# Patient Record
Sex: FEMALE | Race: WHITE | NOT HISPANIC OR LATINO | Employment: FULL TIME | URBAN - METROPOLITAN AREA
[De-identification: names, ages, dates, MRNs, and addresses within clinical notes are randomized per-mention and may not be internally consistent; named-entity substitution may affect disease eponyms.]

---

## 2023-09-28 ENCOUNTER — TRANSCRIBE ORDERS (OUTPATIENT)
Dept: RADIOLOGY | Facility: CLINIC | Age: 53
End: 2023-09-28
Payer: COMMERCIAL

## 2023-09-28 DIAGNOSIS — N93.8 DUB (DYSFUNCTIONAL UTERINE BLEEDING): ICD-10-CM

## 2023-10-20 PROBLEM — L30.9 DERMATITIS, UNSPECIFIED: Status: ACTIVE | Noted: 2018-05-10

## 2023-10-20 PROBLEM — L21.8 OTHER SEBORRHEIC DERMATITIS: Status: ACTIVE | Noted: 2018-05-10

## 2023-10-20 PROBLEM — N93.8 DUB (DYSFUNCTIONAL UTERINE BLEEDING): Status: ACTIVE | Noted: 2023-10-20

## 2023-10-20 PROBLEM — H60.8X3 CHRONIC ECZEMATOUS OTITIS EXTERNA OF BOTH EARS: Status: ACTIVE | Noted: 2023-10-20

## 2023-10-20 PROBLEM — G44.209 TENSION HEADACHE: Status: ACTIVE | Noted: 2023-10-20

## 2023-10-20 PROBLEM — H60.12 CELLULITIS OF LEFT EAR: Status: ACTIVE | Noted: 2023-10-20

## 2023-10-20 PROBLEM — L81.4 OTHER MELANIN HYPERPIGMENTATION: Status: ACTIVE | Noted: 2018-05-10

## 2023-10-20 PROBLEM — R10.2 PELVIC PAIN IN FEMALE: Status: ACTIVE | Noted: 2023-10-20

## 2023-10-20 PROBLEM — B35.1 TINEA UNGUIUM: Status: ACTIVE | Noted: 2018-05-10

## 2023-10-20 RX ORDER — ITRACONAZOLE 100 MG/1
2 CAPSULE ORAL
COMMUNITY
Start: 2017-05-22 | End: 2023-11-13 | Stop reason: ALTCHOICE

## 2023-10-20 RX ORDER — EFINACONAZOLE 100 MG/ML
1 SOLUTION TOPICAL
COMMUNITY
Start: 2018-02-05 | End: 2023-11-13 | Stop reason: ALTCHOICE

## 2023-10-20 RX ORDER — FLUOCINONIDE 1 MG/G
1 CREAM TOPICAL
COMMUNITY
Start: 2018-05-10 | End: 2023-11-13 | Stop reason: ALTCHOICE

## 2023-10-20 RX ORDER — IODOQUINOL 1% - HYDROCORTISONE ACETATE 2% - ALOE POLYSACCHARIDES 1% 10; 20; 10 MG/G; MG/G; MG/G
1 GEL TOPICAL
COMMUNITY
Start: 2018-05-10 | End: 2023-12-04 | Stop reason: ALTCHOICE

## 2023-10-23 ENCOUNTER — OFFICE VISIT (OUTPATIENT)
Dept: OBSTETRICS AND GYNECOLOGY | Facility: CLINIC | Age: 53
End: 2023-10-23
Payer: COMMERCIAL

## 2023-10-23 ENCOUNTER — ANCILLARY PROCEDURE (OUTPATIENT)
Dept: RADIOLOGY | Facility: CLINIC | Age: 53
End: 2023-10-23
Payer: COMMERCIAL

## 2023-10-23 DIAGNOSIS — N92.1 MENORRHAGIA WITH IRREGULAR CYCLE: Primary | ICD-10-CM

## 2023-10-23 DIAGNOSIS — N93.8 DUB (DYSFUNCTIONAL UTERINE BLEEDING): ICD-10-CM

## 2023-10-23 PROCEDURE — 99213 OFFICE O/P EST LOW 20 MIN: CPT | Performed by: OBSTETRICS & GYNECOLOGY

## 2023-10-23 PROCEDURE — 58340 CATHETER FOR HYSTEROGRAPHY: CPT | Performed by: OBSTETRICS & GYNECOLOGY

## 2023-10-23 PROCEDURE — 76830 TRANSVAGINAL US NON-OB: CPT | Performed by: OBSTETRICS & GYNECOLOGY

## 2023-10-23 NOTE — PROGRESS NOTES
Pt here for SIS for menorrhagia, EMB showed possible infarcted polyp  R/b/a reviewed  Verbal consent    Sis shows normal endometrial cavity, 2cm fundal fibroid, normal ovaries    D/w pt normal findings and options for menorrhagia  Pt opts for endometrial ablation  Procedure and risks reviewed  Will verify benefits and schedule

## 2023-10-24 ENCOUNTER — TELEPHONE (OUTPATIENT)
Dept: OBSTETRICS AND GYNECOLOGY | Facility: HOSPITAL | Age: 53
End: 2023-10-24
Payer: COMMERCIAL

## 2023-11-02 ENCOUNTER — PREP FOR PROCEDURE (OUTPATIENT)
Dept: OBSTETRICS AND GYNECOLOGY | Facility: HOSPITAL | Age: 53
End: 2023-11-02
Payer: COMMERCIAL

## 2023-11-02 DIAGNOSIS — N92.1 MENORRHAGIA WITH IRREGULAR CYCLE: Primary | ICD-10-CM

## 2023-11-02 RX ORDER — SODIUM CHLORIDE, SODIUM LACTATE, POTASSIUM CHLORIDE, CALCIUM CHLORIDE 600; 310; 30; 20 MG/100ML; MG/100ML; MG/100ML; MG/100ML
100 INJECTION, SOLUTION INTRAVENOUS CONTINUOUS
Status: CANCELLED | OUTPATIENT
Start: 2023-11-02

## 2023-11-09 ENCOUNTER — HOSPITAL ENCOUNTER (OUTPATIENT)
Dept: RADIOLOGY | Facility: HOSPITAL | Age: 53
Discharge: HOME | End: 2023-11-09
Payer: COMMERCIAL

## 2023-11-09 DIAGNOSIS — Z12.31 ENCOUNTER FOR SCREENING MAMMOGRAM FOR MALIGNANT NEOPLASM OF BREAST: ICD-10-CM

## 2023-11-09 PROCEDURE — 77063 BREAST TOMOSYNTHESIS BI: CPT | Mod: BILATERAL PROCEDURE | Performed by: STUDENT IN AN ORGANIZED HEALTH CARE EDUCATION/TRAINING PROGRAM

## 2023-11-09 PROCEDURE — 77063 BREAST TOMOSYNTHESIS BI: CPT

## 2023-11-09 PROCEDURE — 77067 SCR MAMMO BI INCL CAD: CPT | Mod: BILATERAL PROCEDURE | Performed by: STUDENT IN AN ORGANIZED HEALTH CARE EDUCATION/TRAINING PROGRAM

## 2023-11-10 DIAGNOSIS — Z01.818 PREOP TESTING: Primary | ICD-10-CM

## 2023-11-13 ENCOUNTER — PRE-ADMISSION TESTING (OUTPATIENT)
Dept: PREADMISSION TESTING | Facility: HOSPITAL | Age: 53
End: 2023-11-13
Payer: COMMERCIAL

## 2023-11-13 ENCOUNTER — HOSPITAL ENCOUNTER (OUTPATIENT)
Dept: CARDIOLOGY | Facility: HOSPITAL | Age: 53
Discharge: HOME | End: 2023-11-13
Payer: COMMERCIAL

## 2023-11-13 VITALS
RESPIRATION RATE: 18 BRPM | WEIGHT: 171.08 LBS | HEART RATE: 92 BPM | DIASTOLIC BLOOD PRESSURE: 81 MMHG | BODY MASS INDEX: 29.21 KG/M2 | TEMPERATURE: 97.3 F | HEIGHT: 64 IN | OXYGEN SATURATION: 100 % | SYSTOLIC BLOOD PRESSURE: 147 MMHG

## 2023-11-13 DIAGNOSIS — Z01.818 PRE-OP TESTING: ICD-10-CM

## 2023-11-13 LAB
B-HCG SERPL-ACNC: <2 MIU/ML
ERYTHROCYTE [DISTWIDTH] IN BLOOD BY AUTOMATED COUNT: 12.4 % (ref 11.5–14.5)
HCT VFR BLD AUTO: 41.8 % (ref 36–46)
HGB BLD-MCNC: 13.5 G/DL (ref 12–16)
MCH RBC QN AUTO: 27.5 PG (ref 26–34)
MCHC RBC AUTO-ENTMCNC: 32.3 G/DL (ref 32–36)
MCV RBC AUTO: 85 FL (ref 80–100)
NRBC BLD-RTO: 0 /100 WBCS (ref 0–0)
PLATELET # BLD AUTO: 303 X10*3/UL (ref 150–450)
RBC # BLD AUTO: 4.91 X10*6/UL (ref 4–5.2)
WBC # BLD AUTO: 6.3 X10*3/UL (ref 4.4–11.3)

## 2023-11-13 PROCEDURE — 93010 ELECTROCARDIOGRAM REPORT: CPT | Performed by: INTERNAL MEDICINE

## 2023-11-13 PROCEDURE — 94760 N-INVAS EAR/PLS OXIMETRY 1: CPT

## 2023-11-13 PROCEDURE — 84702 CHORIONIC GONADOTROPIN TEST: CPT | Performed by: NURSE PRACTITIONER

## 2023-11-13 PROCEDURE — 99203 OFFICE O/P NEW LOW 30 MIN: CPT | Performed by: NURSE PRACTITIONER

## 2023-11-13 PROCEDURE — 36415 COLL VENOUS BLD VENIPUNCTURE: CPT | Performed by: NURSE PRACTITIONER

## 2023-11-13 PROCEDURE — 36415 COLL VENOUS BLD VENIPUNCTURE: CPT | Performed by: OBSTETRICS & GYNECOLOGY

## 2023-11-13 PROCEDURE — 93005 ELECTROCARDIOGRAM TRACING: CPT

## 2023-11-13 PROCEDURE — 85027 COMPLETE CBC AUTOMATED: CPT | Performed by: OBSTETRICS & GYNECOLOGY

## 2023-11-13 ASSESSMENT — DUKE ACTIVITY SCORE INDEX (DASI)
CAN YOU DO HEAVY WORK AROUND THE HOUSE LIKE SCRUBBING FLOORS OR LIFTING AND MOVING HEAVY FURNITURE: YES
CAN YOU WALK INDOORS, SUCH AS AROUND YOUR HOUSE: YES
CAN YOU DO YARD WORK LIKE RAKING LEAVES, WEEDING OR PUSHING A MOWER: YES
CAN YOU TAKE CARE OF YOURSELF (EAT, DRESS, BATHE, OR USE TOILET): YES
CAN YOU PARTICIPATE IN MODERATE RECREATIONAL ACTIVITIES LIKE GOLF, BOWLING, DANCING, DOUBLES TENNIS OR THROWING A BASEBALL OR FOOTBALL: NO
CAN YOU RUN A SHORT DISTANCE: YES
CAN YOU DO LIGHT WORK AROUND THE HOUSE LIKE DUSTING OR WASHING DISHES: YES
CAN YOU PARTICIPATE IN STRENOUS SPORTS LIKE SWIMMING, SINGLES TENNIS, FOOTBALL, BASKETBALL, OR SKIING: NO
CAN YOU CLIMB A FLIGHT OF STAIRS OR WALK UP A HILL: YES
CAN YOU WALK A BLOCK OR TWO ON LEVEL GROUND: YES
CAN YOU HAVE SEXUAL RELATIONS: YES
DASI METS SCORE: 8.2
CAN YOU DO MODERATE WORK AROUND THE HOUSE LIKE VACUUMING, SWEEPING FLOORS OR CARRYING GROCERIES: YES
TOTAL_SCORE: 44.7

## 2023-11-13 ASSESSMENT — PAIN SCALES - GENERAL: PAINLEVEL_OUTOF10: 0 - NO PAIN

## 2023-11-13 ASSESSMENT — PAIN - FUNCTIONAL ASSESSMENT: PAIN_FUNCTIONAL_ASSESSMENT: 0-10

## 2023-11-13 NOTE — H&P (VIEW-ONLY)
CPM/PAT Evaluation       Name: Katherine Siegel (Katherine Siegel)  /Age: 1970/52 y.o.     In-Person       Chief Complaint: menorrhagia    52 yr old female with c/o heavy menses.  Reports ongoing bothersome menses symptoms including heavy bleeding and increased menses lasting up to 2-3 weeks long: denies any increased cramping pain or changes in cycle.  Reports remaining otherwise physically active: denies cardiac or respiratory symptoms.  Denies past issues with anesthesia.         Past Medical History:   Diagnosis Date    Mild intermittent asthma, uncomplicated     Intermittent asthma    Skin disorder     Vision loss        Past Surgical History:   Procedure Laterality Date    DILATION AND CURETTAGE OF UTERUS  2018    Dilation And Curettage    DILATION AND CURETTAGE OF UTERUS      OTHER SURGICAL HISTORY  2018    Oral Surgery Tooth Extraction [place] [facility]    WISDOM TOOTH EXTRACTION         Patient Sexual activity questions deferred to the physician.    Family History   Problem Relation Name Age of Onset    Cancer Mother      Prostate cancer Father      Other (dm2 without complication umnspec long term insulin use status) Father      Thyroid disease Father         Allergies   Allergen Reactions    Cephalosporins Unknown    Vantin [Cefpodoxime] Unknown       Prior to Admission medications    Medication Sig Start Date End Date Taking? Authorizing Provider   efinaconazole (Jublia) 10 % solution with applicator 1 Application. 18   Historical Provider, MD   fluocinonide (LIDEX) 0.1 % cream 1 Application. 5/10/18   Historical Provider, MD   hydrocortisone-iodoquinL-aloe2 (Alcortin A) 2-1-1 % gel 1 Application. 5/10/18   Historical Provider, MD   HYDROCORTISONE-IODOQUINOL TOP 1 Application. 17   Historical Provider, MD   itraconazole (Sporanox) 100 mg capsule 2 capsules (200 mg). 17   Historical Provider, MD        Review of Systems  Constitutional: no fever, no chills and not feeling poorly.    Eyes: no eyesight problems.   ENT: no hearing loss, no nosebleeds and no sore throat.   Cardiovascular: no chest pain, no palpitations and no extremity edema.   Respiratory: no shortness of breath, no wheezing, no cough and no shortness of breath during exertion.   Gastrointestinal: no abdominal pain, no constipation, no heartburn, no diarrhea, no vomiting and no blood in stools.   Genitourinary: no dysuria, no incontinence, normal micturition and no testicular pain.   Musculoskeletal: no arthralgias and no gait abnormality.   Integumentary: no skin lesions, no skin wound and no change in a mole.   Neurological: no confusion, no dizziness, no fainting and no difficulty walking.   Psychiatric: not suicidal, no anxiety and no depression.   All other systems have been reviewed and are negative for complaint.     Physical Exam  Constitutional:       General: No acute distress.     Aox3, pleasant and cooperative, appropriate mood and eye contact   HENT:      Head: Normocephalic.      Mouth/Throat: Mucous membranes moist & pink  Eyes:      Vision grossly intact, PERRLA, corrective lenses in use   Neck:      No carotid bruit, no JVD  Cardiovascular:      RRR, S1S2, no murmurs, rubs or gallops  Pulmonary:      Symmetric chest expansion, CTA, Room Air  Abdominal:      Soft non-tender, BSx4   Skin:     Warm, dry & intact   Extremities:      No gross deformities; normal gait   Neurological:      No focal deficit, Aox3, AUGUSTIN x4  Psychiatric:      Pleasant & cooperative, appropriate affect     PAT AIRWAY:   Airway:     Mallampati::  I    TM distance::  >3 FB    Neck ROM::  Full  normal        Visit Vitals  /81   Pulse 92   Temp 36.3 °C (97.3 °F) (Temporal)   Resp 18       DASI Risk Score      Flowsheet Row Most Recent Value   DASI SCORE 44.7   METS Score (Will be calculated only when all the questions are answered) 8.2          Caprini DVT Assessment    No data to display       Modified Frailty Index    No data to  display       CHADS2 Stroke Risk  Current as of 5 minutes ago        N/A 3 - 100%: High Risk   2 - 3%: Medium Risk   0 - 2%: Low Risk     Last Change: N/A          This score determines the patient's risk of having a stroke if the patient has atrial fibrillation.        This score is not applicable to this patient. Components are not calculated.          Revised Cardiac Risk Index    No data to display       Apfel Simplified Score    No data to display       Risk Analysis Index Results This Encounter    No data found in the last 1 encounters.       Stop Bang Score      Flowsheet Row Most Recent Value   Do you snore loudly? 0   Do you often feel tired or fatigued after your sleep? 0   Has anyone ever observed you stop breathing in your sleep? 0   Do you have or are you being treated for high blood pressure? 0   Recent BMI (Calculated) 29   Is BMI greater than 35 kg/m2? 0=No   Age older than 50 years old? 1=Yes   Is your neck circumference greater than 17 inches (Male) or 16 inches (Female)? 0   Gender - Male 0=No   STOP-BANG Total Score 1            Assessment and Plan:     Followed by OB/GYN, menorrhagia, possible infarcted polyp    Hysteroscopy w/tissue ablation w/Dr Lynn on 11/15/2023

## 2023-11-13 NOTE — CPM/PAT H&P
CPM/PAT Evaluation       Name: Katherine Siegel (Katherine Siegel)  /Age: 1970/52 y.o.     In-Person       Chief Complaint: menorrhagia    52 yr old female with c/o heavy menses.  Reports ongoing bothersome menses symptoms including heavy bleeding and increased menses lasting up to 2-3 weeks long: denies any increased cramping pain or changes in cycle.  Reports remaining otherwise physically active: denies cardiac or respiratory symptoms.  Denies past issues with anesthesia.         Past Medical History:   Diagnosis Date    Mild intermittent asthma, uncomplicated     Intermittent asthma    Skin disorder     Vision loss        Past Surgical History:   Procedure Laterality Date    DILATION AND CURETTAGE OF UTERUS  2018    Dilation And Curettage    DILATION AND CURETTAGE OF UTERUS      OTHER SURGICAL HISTORY  2018    Oral Surgery Tooth Extraction [facility] [facility]    WISDOM TOOTH EXTRACTION         Patient Sexual activity questions deferred to the physician.    Family History   Problem Relation Name Age of Onset    Cancer Mother      Prostate cancer Father      Other (dm2 without complication umnspec long term insulin use status) Father      Thyroid disease Father         Allergies   Allergen Reactions    Cephalosporins Unknown    Vantin [Cefpodoxime] Unknown       Prior to Admission medications    Medication Sig Start Date End Date Taking? Authorizing Provider   efinaconazole (Jublia) 10 % solution with applicator 1 Application. 18   Historical Provider, MD   fluocinonide (LIDEX) 0.1 % cream 1 Application. 5/10/18   Historical Provider, MD   hydrocortisone-iodoquinL-aloe2 (Alcortin A) 2-1-1 % gel 1 Application. 5/10/18   Historical Provider, MD   HYDROCORTISONE-IODOQUINOL TOP 1 Application. 17   Historical Provider, MD   itraconazole (Sporanox) 100 mg capsule 2 capsules (200 mg). 17   Historical Provider, MD        Review of Systems  Constitutional: no fever, no chills and not feeling poorly.    Eyes: no eyesight problems.   ENT: no hearing loss, no nosebleeds and no sore throat.   Cardiovascular: no chest pain, no palpitations and no extremity edema.   Respiratory: no shortness of breath, no wheezing, no cough and no shortness of breath during exertion.   Gastrointestinal: no abdominal pain, no constipation, no heartburn, no diarrhea, no vomiting and no blood in stools.   Genitourinary: no dysuria, no incontinence, normal micturition and no testicular pain.   Musculoskeletal: no arthralgias and no gait abnormality.   Integumentary: no skin lesions, no skin wound and no change in a mole.   Neurological: no confusion, no dizziness, no fainting and no difficulty walking.   Psychiatric: not suicidal, no anxiety and no depression.   All other systems have been reviewed and are negative for complaint.     Physical Exam  Constitutional:       General: No acute distress.     Aox3, pleasant and cooperative, appropriate mood and eye contact   HENT:      Head: Normocephalic.      Mouth/Throat: Mucous membranes moist & pink  Eyes:      Vision grossly intact, PERRLA, corrective lenses in use   Neck:      No carotid bruit, no JVD  Cardiovascular:      RRR, S1S2, no murmurs, rubs or gallops  Pulmonary:      Symmetric chest expansion, CTA, Room Air  Abdominal:      Soft non-tender, BSx4   Skin:     Warm, dry & intact   Extremities:      No gross deformities; normal gait   Neurological:      No focal deficit, Aox3, AUGUSTIN x4  Psychiatric:      Pleasant & cooperative, appropriate affect     PAT AIRWAY:   Airway:     Mallampati::  I    TM distance::  >3 FB    Neck ROM::  Full  normal        Visit Vitals  /81   Pulse 92   Temp 36.3 °C (97.3 °F) (Temporal)   Resp 18       DASI Risk Score      Flowsheet Row Most Recent Value   DASI SCORE 44.7   METS Score (Will be calculated only when all the questions are answered) 8.2          Caprini DVT Assessment    No data to display       Modified Frailty Index    No data to  display       CHADS2 Stroke Risk  Current as of 5 minutes ago        N/A 3 - 100%: High Risk   2 - 3%: Medium Risk   0 - 2%: Low Risk     Last Change: N/A          This score determines the patient's risk of having a stroke if the patient has atrial fibrillation.        This score is not applicable to this patient. Components are not calculated.          Revised Cardiac Risk Index    No data to display       Apfel Simplified Score    No data to display       Risk Analysis Index Results This Encounter    No data found in the last 1 encounters.       Stop Bang Score      Flowsheet Row Most Recent Value   Do you snore loudly? 0   Do you often feel tired or fatigued after your sleep? 0   Has anyone ever observed you stop breathing in your sleep? 0   Do you have or are you being treated for high blood pressure? 0   Recent BMI (Calculated) 29   Is BMI greater than 35 kg/m2? 0=No   Age older than 50 years old? 1=Yes   Is your neck circumference greater than 17 inches (Male) or 16 inches (Female)? 0   Gender - Male 0=No   STOP-BANG Total Score 1            Assessment and Plan:     Followed by OB/GYN, menorrhagia, possible infarcted polyp    Hysteroscopy w/tissue ablation w/Dr Lynn on 11/15/2023

## 2023-11-13 NOTE — PREPROCEDURE INSTRUCTIONS
Medication List            Accurate as of November 13, 2023 10:04 AM. Always use your most recent med list.                Alcortin A 2-1-1 % gel  Generic drug: hydrocortisone-iodoquinL-aloe2  Notes to patient: May use daily but NOT to apply day of surgery                PRE-OPERATIVE INSTRUCTIONS FOR SURGERY    *Do not eat anything after midnight the night of surgery.  This includes food of any kind (including hard candy, cough drops, mints and gum) and beverages (including coffee and soda).        *One of our staff members will call you ONE business day before your surgery, between 11a-2p  To let you know the time to arrive.  If you have no received a call by 2 pm, call [phone]    *When you arrive at the hospital-->GO TO Registration on the ground floor    *Stop smoking 24 hours prior to surgery.  No Marijuana, CBD Oil or Vaping for 48 hours    *No alcohol 24 hours prior to surgery  *You will need a responsible adult to drive you home  -No acrylic nails or nail polish on at least one fingernail, NO polish on toes for foot surgery    -You may be asked to remove your dentures, partial plate, eyeglasses or contact lenses before going to surgery.  Please bring a case for these items.  -Body piercings need to be removed.  Jewelry and valuables should be left at home.    -Put on loose,  comfortable, clean clothing, that will accommodate bandages    HOME PREOPERATIVE ANTIBACTERIAL SHOWER:  -This shower is a way of cleaning the skin with germ killing solution before surgery.  The solution contains Chorhexidine (CHG).   Let your Dr. Know if you are allergic to Chlorhexidine.    N  What you may be asked to bring to surgery:  ___Crutches, walker  ___CPAP machine  ___Urine specimen               NPO Instructions:    Do not eat any food after midnight the night before your surgery/procedure.    Additional Instructions:     Day of Surgery:  Wear  comfortable loose fitting clothing  Do not use moisturizers, creams,  lotions or perfume  All jewelry and valuables should be left at home

## 2023-11-15 ENCOUNTER — ANESTHESIA (OUTPATIENT)
Dept: OPERATING ROOM | Facility: HOSPITAL | Age: 53
End: 2023-11-15
Payer: COMMERCIAL

## 2023-11-15 ENCOUNTER — ANESTHESIA EVENT (OUTPATIENT)
Dept: OPERATING ROOM | Facility: HOSPITAL | Age: 53
End: 2023-11-15
Payer: COMMERCIAL

## 2023-11-15 ENCOUNTER — PREP FOR PROCEDURE (OUTPATIENT)
Dept: OPERATING ROOM | Facility: HOSPITAL | Age: 53
End: 2023-11-15

## 2023-11-15 ENCOUNTER — HOSPITAL ENCOUNTER (OUTPATIENT)
Facility: HOSPITAL | Age: 53
Setting detail: OUTPATIENT SURGERY
Discharge: HOME | End: 2023-11-15
Attending: OBSTETRICS & GYNECOLOGY | Admitting: OBSTETRICS & GYNECOLOGY
Payer: COMMERCIAL

## 2023-11-15 VITALS
RESPIRATION RATE: 18 BRPM | DIASTOLIC BLOOD PRESSURE: 93 MMHG | SYSTOLIC BLOOD PRESSURE: 140 MMHG | BODY MASS INDEX: 29.21 KG/M2 | WEIGHT: 171.08 LBS | HEART RATE: 74 BPM | OXYGEN SATURATION: 99 % | TEMPERATURE: 97.7 F | HEIGHT: 64 IN

## 2023-11-15 DIAGNOSIS — N92.1 MENORRHAGIA WITH IRREGULAR CYCLE: Primary | ICD-10-CM

## 2023-11-15 DIAGNOSIS — Z01.818 PRE-OP TESTING: ICD-10-CM

## 2023-11-15 PROCEDURE — 3700000001 HC GENERAL ANESTHESIA TIME - INITIAL BASE CHARGE: Performed by: OBSTETRICS & GYNECOLOGY

## 2023-11-15 PROCEDURE — A58563 PR HYSTEROSCOPY,W/ENDOMETRIAL ABLATION: Performed by: ANESTHESIOLOGY

## 2023-11-15 PROCEDURE — 3600000003 HC OR TIME - INITIAL BASE CHARGE - PROCEDURE LEVEL THREE: Performed by: OBSTETRICS & GYNECOLOGY

## 2023-11-15 PROCEDURE — 88305 TISSUE EXAM BY PATHOLOGIST: CPT | Mod: TC,SUR,PARLAB | Performed by: OBSTETRICS & GYNECOLOGY

## 2023-11-15 PROCEDURE — 2500000005 HC RX 250 GENERAL PHARMACY W/O HCPCS

## 2023-11-15 PROCEDURE — 88305 TISSUE EXAM BY PATHOLOGIST: CPT | Mod: TC,PARLAB | Performed by: OBSTETRICS & GYNECOLOGY

## 2023-11-15 PROCEDURE — 58563 HYSTEROSCOPY ABLATION: CPT | Performed by: OBSTETRICS & GYNECOLOGY

## 2023-11-15 PROCEDURE — 7100000002 HC RECOVERY ROOM TIME - EACH INCREMENTAL 1 MINUTE: Performed by: OBSTETRICS & GYNECOLOGY

## 2023-11-15 PROCEDURE — 3600000008 HC OR TIME - EACH INCREMENTAL 1 MINUTE - PROCEDURE LEVEL THREE: Performed by: OBSTETRICS & GYNECOLOGY

## 2023-11-15 PROCEDURE — A58563 PR HYSTEROSCOPY,W/ENDOMETRIAL ABLATION

## 2023-11-15 PROCEDURE — 3700000002 HC GENERAL ANESTHESIA TIME - EACH INCREMENTAL 1 MINUTE: Performed by: OBSTETRICS & GYNECOLOGY

## 2023-11-15 PROCEDURE — 7100000001 HC RECOVERY ROOM TIME - INITIAL BASE CHARGE: Performed by: OBSTETRICS & GYNECOLOGY

## 2023-11-15 PROCEDURE — 2720000007 HC OR 272 NO HCPCS: Performed by: OBSTETRICS & GYNECOLOGY

## 2023-11-15 PROCEDURE — 2500000004 HC RX 250 GENERAL PHARMACY W/ HCPCS (ALT 636 FOR OP/ED)

## 2023-11-15 PROCEDURE — 88305 TISSUE EXAM BY PATHOLOGIST: CPT | Performed by: STUDENT IN AN ORGANIZED HEALTH CARE EDUCATION/TRAINING PROGRAM

## 2023-11-15 PROCEDURE — 2500000004 HC RX 250 GENERAL PHARMACY W/ HCPCS (ALT 636 FOR OP/ED): Performed by: OBSTETRICS & GYNECOLOGY

## 2023-11-15 PROCEDURE — 7100000009 HC PHASE TWO TIME - INITIAL BASE CHARGE: Performed by: OBSTETRICS & GYNECOLOGY

## 2023-11-15 PROCEDURE — 7100000010 HC PHASE TWO TIME - EACH INCREMENTAL 1 MINUTE: Performed by: OBSTETRICS & GYNECOLOGY

## 2023-11-15 RX ORDER — ONDANSETRON HYDROCHLORIDE 2 MG/ML
INJECTION, SOLUTION INTRAVENOUS AS NEEDED
Status: DISCONTINUED | OUTPATIENT
Start: 2023-11-15 | End: 2023-11-15

## 2023-11-15 RX ORDER — FENTANYL CITRATE 50 UG/ML
INJECTION, SOLUTION INTRAMUSCULAR; INTRAVENOUS AS NEEDED
Status: DISCONTINUED | OUTPATIENT
Start: 2023-11-15 | End: 2023-11-15

## 2023-11-15 RX ORDER — SODIUM CHLORIDE, SODIUM LACTATE, POTASSIUM CHLORIDE, CALCIUM CHLORIDE 600; 310; 30; 20 MG/100ML; MG/100ML; MG/100ML; MG/100ML
100 INJECTION, SOLUTION INTRAVENOUS CONTINUOUS
Status: DISCONTINUED | OUTPATIENT
Start: 2023-11-15 | End: 2023-11-15 | Stop reason: HOSPADM

## 2023-11-15 RX ORDER — KETOROLAC TROMETHAMINE 30 MG/ML
INJECTION, SOLUTION INTRAMUSCULAR; INTRAVENOUS AS NEEDED
Status: DISCONTINUED | OUTPATIENT
Start: 2023-11-15 | End: 2023-11-15

## 2023-11-15 RX ORDER — LIDOCAINE HYDROCHLORIDE 10 MG/ML
0.1 INJECTION, SOLUTION EPIDURAL; INFILTRATION; INTRACAUDAL; PERINEURAL ONCE
Status: DISCONTINUED | OUTPATIENT
Start: 2023-11-15 | End: 2023-11-15 | Stop reason: HOSPADM

## 2023-11-15 RX ORDER — HYDROMORPHONE HYDROCHLORIDE 1 MG/ML
1 INJECTION, SOLUTION INTRAMUSCULAR; INTRAVENOUS; SUBCUTANEOUS EVERY 5 MIN PRN
Status: DISCONTINUED | OUTPATIENT
Start: 2023-11-15 | End: 2023-11-15 | Stop reason: HOSPADM

## 2023-11-15 RX ORDER — MEPERIDINE HYDROCHLORIDE 25 MG/ML
12.5 INJECTION INTRAMUSCULAR; INTRAVENOUS; SUBCUTANEOUS EVERY 10 MIN PRN
Status: DISCONTINUED | OUTPATIENT
Start: 2023-11-15 | End: 2023-11-15 | Stop reason: HOSPADM

## 2023-11-15 RX ORDER — MIDAZOLAM HYDROCHLORIDE 1 MG/ML
INJECTION, SOLUTION INTRAMUSCULAR; INTRAVENOUS AS NEEDED
Status: DISCONTINUED | OUTPATIENT
Start: 2023-11-15 | End: 2023-11-15

## 2023-11-15 RX ORDER — PROPOFOL 10 MG/ML
INJECTION, EMULSION INTRAVENOUS AS NEEDED
Status: DISCONTINUED | OUTPATIENT
Start: 2023-11-15 | End: 2023-11-15

## 2023-11-15 RX ORDER — DEXAMETHASONE SODIUM PHOSPHATE 4 MG/ML
INJECTION, SOLUTION INTRA-ARTICULAR; INTRALESIONAL; INTRAMUSCULAR; INTRAVENOUS; SOFT TISSUE AS NEEDED
Status: DISCONTINUED | OUTPATIENT
Start: 2023-11-15 | End: 2023-11-15

## 2023-11-15 RX ORDER — LIDOCAINE HCL/PF 100 MG/5ML
SYRINGE (ML) INTRAVENOUS AS NEEDED
Status: DISCONTINUED | OUTPATIENT
Start: 2023-11-15 | End: 2023-11-15

## 2023-11-15 RX ADMIN — KETOROLAC TROMETHAMINE 30 MG: 30 INJECTION, SOLUTION INTRAMUSCULAR; INTRAVENOUS at 08:15

## 2023-11-15 RX ADMIN — LIDOCAINE HYDROCHLORIDE 100 MG: 20 INJECTION INTRAVENOUS at 07:48

## 2023-11-15 RX ADMIN — MIDAZOLAM 2 MG: 1 INJECTION INTRAMUSCULAR; INTRAVENOUS at 07:45

## 2023-11-15 RX ADMIN — SODIUM CHLORIDE, POTASSIUM CHLORIDE, SODIUM LACTATE AND CALCIUM CHLORIDE 100 ML/HR: 600; 310; 30; 20 INJECTION, SOLUTION INTRAVENOUS at 07:14

## 2023-11-15 RX ADMIN — ONDANSETRON 4 MG: 2 INJECTION INTRAMUSCULAR; INTRAVENOUS at 08:15

## 2023-11-15 RX ADMIN — DEXAMETHASONE SODIUM PHOSPHATE 4 MG: 4 INJECTION, SOLUTION INTRAMUSCULAR; INTRAVENOUS at 07:52

## 2023-11-15 RX ADMIN — FENTANYL CITRATE 100 MCG: 50 INJECTION, SOLUTION INTRAMUSCULAR; INTRAVENOUS at 07:48

## 2023-11-15 RX ADMIN — PROPOFOL 150 MG: 10 INJECTION, EMULSION INTRAVENOUS at 07:48

## 2023-11-15 RX ADMIN — PROPOFOL 50 MG: 10 INJECTION, EMULSION INTRAVENOUS at 08:00

## 2023-11-15 RX ADMIN — SODIUM CHLORIDE, POTASSIUM CHLORIDE, SODIUM LACTATE AND CALCIUM CHLORIDE: 600; 310; 30; 20 INJECTION, SOLUTION INTRAVENOUS at 07:44

## 2023-11-15 SDOH — HEALTH STABILITY: MENTAL HEALTH: CURRENT SMOKER: 0

## 2023-11-15 ASSESSMENT — PAIN SCALES - GENERAL
PAINLEVEL_OUTOF10: 0 - NO PAIN
PAIN_LEVEL: 0
PAINLEVEL_OUTOF10: 0 - NO PAIN

## 2023-11-15 ASSESSMENT — PAIN - FUNCTIONAL ASSESSMENT
PAIN_FUNCTIONAL_ASSESSMENT: 0-10
PAIN_FUNCTIONAL_ASSESSMENT: 0-10

## 2023-11-15 NOTE — ANESTHESIA PROCEDURE NOTES
Airway  Date/Time: 11/15/2023 7:50 AM  Urgency: elective    Airway not difficult    Staffing  Performed: CRNA   Authorized by: Tony Vick MD    Performed by: ERI [place]-CRNA  Patient location during procedure: OR    Indications and Patient Condition  Indications for airway management: anesthesia  Spontaneous ventilation: present  Sedation level: deep  Preoxygenated: yes  Patient position: sniffing  Mask difficulty assessment: 0 - not attempted  Planned trial extubation    Final Airway Details  Final airway type: supraglottic airway      Successful airway: classic  Size 4     Number of attempts at approach: 1

## 2023-11-15 NOTE — ANESTHESIA POSTPROCEDURE EVALUATION
Patient: Katherine Siegel    Procedure Summary       Date: 11/15/23 Room / Location: [facility] 04 / [facility]    Anesthesia Start: 0744 Anesthesia Stop:     Procedure: HYSTEROSCOPY WITH TISSUE ABLATION Diagnosis:       Menorrhagia with irregular cycle      (Menorrhagia with irregular cycle [N92.1])    Surgeons: Judith Lynn MD Responsible Provider: Tony Vick MD    Anesthesia Type: Not recorded ASA Status: Not recorded            Anesthesia Type: No value filed.    Vitals Value Taken Time   /77 11/15/23 0823   Temp 36.5 °C (97.7 °F) 11/15/23 0822   Pulse 82 11/15/23 0823   Resp 14 11/15/23 0822   SpO2 100 % 11/15/23 0823   Vitals shown include unvalidated device data.    Anesthesia Post Evaluation    Patient location during evaluation: PACU  Patient participation: complete - patient participated  Level of consciousness: awake and alert  Pain score: 0  Pain management: adequate  Airway patency: patent  Cardiovascular status: acceptable  Respiratory status: acceptable  Hydration status: acceptable  Postoperative Nausea and Vomiting: none        No notable events documented.

## 2023-11-15 NOTE — ANESTHESIA PREPROCEDURE EVALUATION
Patient: Katherine Siegel    Procedure Information       Anesthesia Start Date/Time: 11/15/23 0744    Procedure: HYSTEROSCOPY WITH TISSUE ABLATION    Location: [facility] / [facility]    Surgeons: Judith Lynn MD            Relevant Problems   Anesthesia (within normal limits)      Infectious Disease   (+) Onychomycosis   (+) Tinea unguium       Clinical information reviewed:   Tobacco  Allergies  Meds   Med Hx  Surg Hx  OB Status  Fam Hx  Soc   Hx        NPO Detail:  NPO/Void Status  Date of Last Liquid: 11/14/23  Time of Last Liquid: 2000  Date of Last Solid: 11/14/23  Time of Last Solid: 2000         Physical Exam    Airway  Mallampati: II  TM distance: >3 FB  Neck ROM: full     Cardiovascular - normal exam     Dental - normal exam     Pulmonary - normal exam     Abdominal - normal exam             Anesthesia Plan    ASA 2     general     The patient is not a current smoker.  Patient was previously instructed to abstain from smoking on day of procedure.  Patient did not smoke on day of procedure.    intravenous induction   Postoperative administration of opioids is intended.  Trial extubation is planned.  Anesthetic plan and risks discussed with patient.  Use of blood products discussed with patient who consented to blood products.    Plan discussed with CRNA.

## 2023-11-15 NOTE — OP NOTE
HYSTEROSCOPY WITH TISSUE ABLATION Operative Note     Date: 11/15/2023  OR Location: [facility]    Name: Katherine Siegel, : 1970, Age: 52 y.o., MRN: [id number], Sex: female    Diagnosis  Pre-op Diagnosis     * Menorrhagia with irregular cycle [N92.1] Post-op Diagnosis     * Menorrhagia with irregular cycle [N92.1]     Procedures  HYSTEROSCOPY WITH TISSUE ABLATION  67773 - TN HYSTEROSCOPY ENDOMETRIAL ABLATION    TN HYSTEROSCOPY ENDOMETRIAL ABLATION [89547]  Surgeons      * Judith Lynn - Primary    Resident/Fellow/Other Assistant:  Surgeon(s) and Role:    Procedure Summary  Anesthesia: General  ASA: ASA status not filed in the log.  Anesthesia Staff: Anesthesiologist: Tony Vick MD  CRNA: ERI [place]-CRNA  Estimated Blood Loss: 5mL  Intra-op Medications: * Intraprocedure medication information is unavailable because the case start and end events have not been set *           Anesthesia Record               Intraprocedure I/O Totals          Intake    .00 mL    Propofol Drip 0.00 mL    The total shown is the total volume documented since Anesthesia Start was filed.    Total Intake 800 mL          Specimen:   ID Type Source Tests Collected by Time   1 : endometrial currettings Tissue ENDOMETRIUM BIOPSY SURGICAL PATHOLOGY EXAM Judith Lynn MD 11/15/2023 0816        Staff:   Circulator: Noemi Caldwell RN  Scrub Person: Barby Rojo         Drains and/or Catheters: * None in log *    Tourniquet Times:         Implants:     Findings: Normal-appearing endometrial cavity    Indications: Katherine Siegel is an 52 y.o. female who is having surgery for Menorrhagia with irregular cycle [N92.1].     The patient was seen in the preoperative area. The risks, benefits, complications, treatment options, non-operative alternatives, expected recovery and outcomes were discussed with the patient. The possibilities of reaction to medication, pulmonary aspiration, injury to surrounding structures, bleeding,  recurrent infection, the need for additional procedures, failure to diagnose a condition, and creating a complication requiring transfusion or operation were discussed with the patient. The patient concurred with the proposed plan, giving informed consent.  The site of surgery was properly noted/marked if necessary per policy. The patient has been actively warmed in preoperative area. Preoperative antibiotics are not indicated. Venous thrombosis prophylaxis have been ordered including bilateral sequential compression devices    Procedure Details: Patient was taken to the OR where her anesthesia was found to be adequate.  She was prepped and draped in a normal sterile fashion in the dorsolithotomy position with her legs in the candycane stirrups.  A weighted speculum was placed in the posterior vagina and a right angle retractor was placed in the anterior vagina.  The cervix was grasped with a Holguin tenaculum.  The uterus was sounded to 8-1/2 cm.  The cervix was then dilated to accommodate the hysteroscope.  The hysteroscope was inserted and normal saline was used as the distention medium.  Findings are stated above.  The hysteroscope was then removed and a smooth curette was used to obtain endometrial curettings.  The NovaSure endometrial device was then placed and after passing cavity assessment the ablation proceeded.  The device was then removed.  The hysteroscope was reinserted and thoroughly ablated endometrial cavity was noted.  The instruments were then removed.  Excellent hemostasis was noted.  The patient was awakened and taken to recovery room in stable condition.  Complications:  None; patient tolerated the procedure well.    Disposition: PACU - hemodynamically stable.  Condition: stable         Additional Details: none    Attending Attestation: I performed the procedure.    Judith Lynn  Phone Number: [phone]

## 2023-11-15 NOTE — BRIEF OP NOTE
Date: 11/15/2023  OR Location: [facility] OR    Name: Katherine Siegel, : 1970, Age: 52 y.o., MRN: [id number], Sex: female    Diagnosis  Pre-op Diagnosis     * Menorrhagia with irregular cycle [N92.1] Post-op Diagnosis     * Menorrhagia with irregular cycle [N92.1]     Procedures  HYSTEROSCOPY WITH TISSUE ABLATION  31814 - DE HYSTEROSCOPY ENDOMETRIAL ABLATION    DE HYSTEROSCOPY ENDOMETRIAL ABLATION [20360]  Surgeons      * Judith Lynn - Primary    Resident/Fellow/Other Assistant:  Surgeon(s) and Role:    Procedure Summary  Anesthesia: General  ASA: ASA status not filed in the log.  Anesthesia Staff: Anesthesiologist: Tony Vick MD  CRNA: ERI [place]-CRNA  Estimated Blood Loss: 5mL  Intra-op Medications: * Intraprocedure medication information is unavailable because the case start and end events have not been set *           Anesthesia Record               Intraprocedure I/O Totals          Intake    .00 mL    Propofol Drip 0.00 mL    The total shown is the total volume documented since Anesthesia Start was filed.    Total Intake 800 mL          Specimen:   ID Type Source Tests Collected by Time   1 : endometrial currettings Tissue ENDOMETRIUM BIOPSY SURGICAL PATHOLOGY EXAM Judith Lynn MD 11/15/2023 0816        Staff:   Circulator: Noemi Caldwell RN  Scrub Person: Barby Alia          Findings: normal appearing uterine cavity    Complications:  None; patient tolerated the procedure well.     Disposition: PACU - hemodynamically stable.  Condition: stable  Specimens Collected:   ID Type Source Tests Collected by Time   1 : endometrial currettings Tissue ENDOMETRIUM BIOPSY SURGICAL PATHOLOGY EXAM Judith Lynn MD 11/15/2023 0816     Attending Attestation: I performed the procedure.    Judith Lynn  Phone Number: [phone]

## 2023-11-16 ASSESSMENT — PAIN SCALES - GENERAL: PAINLEVEL_OUTOF10: 0 - NO PAIN

## 2023-11-22 LAB
LABORATORY COMMENT REPORT: NORMAL
PATH REPORT.FINAL DX SPEC: NORMAL
PATH REPORT.GROSS SPEC: NORMAL
PATH REPORT.RELEVANT HX SPEC: NORMAL
PATH REPORT.TOTAL CANCER: NORMAL

## 2023-11-24 ENCOUNTER — DOCUMENTATION (OUTPATIENT)
Dept: OBSTETRICS AND GYNECOLOGY | Facility: CLINIC | Age: 53
End: 2023-11-24
Payer: COMMERCIAL

## 2023-11-25 LAB
ATRIAL RATE: 90 BPM
P AXIS: 62 DEGREES
P OFFSET: 210 MS
P ONSET: 161 MS
PR INTERVAL: 120 MS
Q ONSET: 221 MS
QRS COUNT: 15 BEATS
QRS DURATION: 76 MS
QT INTERVAL: 366 MS
QTC CALCULATION(BAZETT): 447 MS
QTC FREDERICIA: 419 MS
R AXIS: 41 DEGREES
T AXIS: 16 DEGREES
T OFFSET: 404 MS
VENTRICULAR RATE: 90 BPM

## 2023-12-03 RX ORDER — MEDROXYPROGESTERONE ACETATE 5 MG/1
TABLET ORAL
COMMUNITY
Start: 2023-08-04 | End: 2023-12-04 | Stop reason: ALTCHOICE

## 2023-12-04 ENCOUNTER — OFFICE VISIT (OUTPATIENT)
Dept: OBSTETRICS AND GYNECOLOGY | Facility: CLINIC | Age: 53
End: 2023-12-04
Payer: COMMERCIAL

## 2023-12-04 VITALS
BODY MASS INDEX: 29.02 KG/M2 | SYSTOLIC BLOOD PRESSURE: 122 MMHG | HEIGHT: 64 IN | WEIGHT: 170 LBS | DIASTOLIC BLOOD PRESSURE: 76 MMHG

## 2023-12-04 DIAGNOSIS — Z09 SURGERY FOLLOW-UP EXAMINATION: ICD-10-CM

## 2023-12-04 PROCEDURE — 1036F TOBACCO NON-USER: CPT | Performed by: OBSTETRICS & GYNECOLOGY

## 2023-12-04 PROCEDURE — 99213 OFFICE O/P EST LOW 20 MIN: CPT | Performed by: OBSTETRICS & GYNECOLOGY

## 2023-12-04 ASSESSMENT — PAIN SCALES - GENERAL: PAINLEVEL: 0-NO PAIN

## 2023-12-04 NOTE — PROGRESS NOTES
"Subjective   Katherine Siegel is a 52 y.o. female who presents to the clinic 2 weeks status post  hysteroscopy D&C enodmetrial ablation  for  menorrhagia . Eating a regular diet without difficulty. Bowel movements are normal. The patient is not having any pain.    Objective   /76   Ht 1.626 m (5' 4\")   Wt 77.1 kg (170 lb)   LMP 11/03/2023   BMI 29.18 kg/m²   General:  alert and oriented, in no acute distress   Abdomen: soft, bowel sounds active, non-tender   Incision:    No incision   EGBUS normal  Vagina no lesions  Cervix no lesions  Uterus/adnexa NT    Assessment/Plan    Doing well postoperatively.  Operative findings again reviewed. Pathology report discussed.    1. Continue any current medications.  3. Activity restrictions: none  4. Anticipated return to work: not applicable.  5. Follow up: for annual  "

## 2025-04-16 ENCOUNTER — HOSPITAL ENCOUNTER (OUTPATIENT)
Dept: RADIOLOGY | Facility: HOSPITAL | Age: 55
Discharge: HOME | End: 2025-04-16
Payer: COMMERCIAL

## 2025-04-16 VITALS — HEIGHT: 64 IN | BODY MASS INDEX: 29.02 KG/M2 | WEIGHT: 170 LBS

## 2025-04-16 DIAGNOSIS — Z12.31 SCREENING MAMMOGRAM FOR BREAST CANCER: ICD-10-CM

## 2025-04-16 PROCEDURE — 77063 BREAST TOMOSYNTHESIS BI: CPT | Performed by: STUDENT IN AN ORGANIZED HEALTH CARE EDUCATION/TRAINING PROGRAM

## 2025-04-16 PROCEDURE — 77067 SCR MAMMO BI INCL CAD: CPT

## 2025-04-16 PROCEDURE — 77067 SCR MAMMO BI INCL CAD: CPT | Performed by: STUDENT IN AN ORGANIZED HEALTH CARE EDUCATION/TRAINING PROGRAM

## 2025-06-05 ENCOUNTER — APPOINTMENT (OUTPATIENT)
Dept: OBSTETRICS AND GYNECOLOGY | Facility: CLINIC | Age: 55
End: 2025-06-05
Payer: COMMERCIAL

## 2025-06-05 VITALS
DIASTOLIC BLOOD PRESSURE: 86 MMHG | HEIGHT: 64 IN | BODY MASS INDEX: 30.66 KG/M2 | WEIGHT: 179.6 LBS | SYSTOLIC BLOOD PRESSURE: 140 MMHG

## 2025-06-05 DIAGNOSIS — Z01.419 WELL WOMAN EXAM: ICD-10-CM

## 2025-06-05 DIAGNOSIS — Z12.11 COLON CANCER SCREENING: Primary | ICD-10-CM

## 2025-06-05 PROCEDURE — 1036F TOBACCO NON-USER: CPT | Performed by: OBSTETRICS & GYNECOLOGY

## 2025-06-05 PROCEDURE — 3008F BODY MASS INDEX DOCD: CPT | Performed by: OBSTETRICS & GYNECOLOGY

## 2025-06-05 PROCEDURE — 99396 PREV VISIT EST AGE 40-64: CPT | Performed by: OBSTETRICS & GYNECOLOGY

## 2025-06-05 ASSESSMENT — PAIN SCALES - GENERAL: PAINLEVEL_OUTOF10: 0-NO PAIN

## 2025-06-05 NOTE — PROGRESS NOTES
"Katherine Siegel is a 54 y.o. female who is here for a routine exam. PCP = Judith Lynn MD    Menses : none ablation  Contraception : other  HPV vaccine : No  Last pap : 2023 normal  Last HPV : 2023 negative  History of abnormal pap : No  Last mammogram : 2025 normal  History of abnormal mammogram : No  Colon cancer screen : never    ROS  systems reviewed, anything negative noted in HPI    bladder: no dysuria, gross hematuria, urinary frequency, urgency or incontinence  breast: no lumps, nipple d/c, overlying skin changes, redness, or skin retraction    [unfilled]    Past med hx and past surg hx reviewed and notable for:     Objective   /86   Ht 1.626 m (5' 4.02\")   Wt 81.5 kg (179 lb 9.6 oz)   LMP  (Approximate) Comment: LMP around 11/2024  BMI 30.81 kg/m²      General:   Alert and oriented, in no acute distress   Neck: Supple. No visible thyromegaly.    Breast/Axilla: Normal to palpation bilaterally without masses, skin changes, lymphadenopathy, or nipple discharge.    Abdomen: Soft, non-tender, without masses or organomegaly   Vulva:  Urethra: Normal architecture without erythema, masses, or lesions.   Normal    Vagina: Normal mucosa without lesions, masses, or atrophy. No abnormal vaginal discharge.    Cervix: Normal without masses, lesions, or signs of cervicitis.    Uterus: Normal mobile, non-enlarged uterus    Adnexa: Normal without masses or lesions   Pelvic Floor: No POP noted.    Psych:  Anus/Perineum: Normal affect. Normal mood.   Normal without masses or lesions      Thank you for coming to your annual exam. Your findings during the exam were normal. Specific topics addressed during this exam included: healthy lifestyle, well woman screening guidelines,     Actions performed during this visit include:  - Clinical breast exam  - Clinical pelvic exam  - pap  - Mammogram up to date    Orders Placed This Encounter   Procedures    Cologuard® colon cancer screening           Please return for your next " visit in 1 year.

## 2025-06-30 LAB
CYTOLOGY CMNT CVX/VAG CYTO-IMP: NORMAL
LAB AP HPV GENOTYPE QUESTION: YES
LAB AP HPV HR: NORMAL
LABORATORY COMMENT REPORT: NORMAL
PATH REPORT.TOTAL CANCER: NORMAL

## (undated) DEVICE — STRAP, STIRRUP, W/ SLIP RING

## (undated) DEVICE — SLEEVE, VASO PRESS, CALF GARMENT, MEDIUM, GREEN

## (undated) DEVICE — IRRIGATION SET, CYSTOSCOPY, REGULATING CLAMP, STRAIGHT, 81 IN

## (undated) DEVICE — BASIN KIT, SINGLE

## (undated) DEVICE — Device

## (undated) DEVICE — TUBING, CLEAR N-COND, 5MM X 10, LF

## (undated) DEVICE — CATHETER, URETHRAL, ALL PURPOSE, 16FR

## (undated) DEVICE — CATHETER TRAY, SURESTEP, 16FR, URINE METER W/STATLOCK

## (undated) DEVICE — DEVICE, NOVA SURE V5, ENDOMETRIAL ABLATION